# Patient Record
Sex: MALE | Race: WHITE | Employment: FULL TIME | URBAN - METROPOLITAN AREA
[De-identification: names, ages, dates, MRNs, and addresses within clinical notes are randomized per-mention and may not be internally consistent; named-entity substitution may affect disease eponyms.]

---

## 2022-03-08 ENCOUNTER — HOSPITAL ENCOUNTER (EMERGENCY)
Age: 47
Discharge: HOME | End: 2022-03-08
Attending: EMERGENCY MEDICINE
Payer: COMMERCIAL

## 2022-03-08 VITALS
HEIGHT: 72 IN | SYSTOLIC BLOOD PRESSURE: 174 MMHG | TEMPERATURE: 98.4 F | OXYGEN SATURATION: 95 % | WEIGHT: 253 LBS | BODY MASS INDEX: 34.27 KG/M2 | HEART RATE: 79 BPM | RESPIRATION RATE: 16 BRPM | DIASTOLIC BLOOD PRESSURE: 113 MMHG

## 2022-03-08 DIAGNOSIS — L03.116 CELLULITIS OF LEFT LOWER EXTREMITY: Primary | ICD-10-CM

## 2022-03-08 PROCEDURE — 99283 EMERGENCY DEPT VISIT LOW MDM: CPT

## 2022-03-08 RX ORDER — CLINDAMYCIN HYDROCHLORIDE 300 MG/1
300 CAPSULE ORAL 4 TIMES DAILY
Qty: 28 CAPSULE | Refills: 0 | Status: SHIPPED | OUTPATIENT
Start: 2022-03-08 | End: 2022-03-15

## 2022-03-09 NOTE — ED TRIAGE NOTES
Pt c/o red, warm, painful on palpation eros on L shin that began this morning. No recent long travel, no smoker.

## 2022-03-09 NOTE — DISCHARGE INSTRUCTIONS
*IBUPROFEN 600MG EVERY 8 HOURS, OR NAPROXEN 400MG EVERY 12 HOURS FOR PAIN. *WARM COMPRESSES AS OFTEN AS POSSIBLE.

## 2022-03-09 NOTE — ED PROVIDER NOTES
Patient presents for evaluation of a tender, red and slightly raised area on the distal shin of the left lower extremity. He states it started this morning and seems to be getting gradually worse. He denies any trauma. He denies any fever or chills. The history is provided by the patient. Skin Problem   This is a new problem. The current episode started 6 to 12 hours ago. The problem has been gradually worsening. The problem is associated with an unknown factor. There has been no fever. The rash is present on the left lower leg. The pain is at a severity of 4/10. The pain is mild. The pain has been constant since onset. Associated symptoms include pain. Pertinent negatives include no blisters, no itching, no weeping and no hives. He has tried nothing for the symptoms. The treatment provided no relief. Past Medical History:   Diagnosis Date    Gastrointestinal disorder     HTN (hypertension) 11/22/2013       History reviewed. No pertinent surgical history.       Family History:   Problem Relation Age of Onset   Charley Cor[place] Cancer Mother         breast    Hypertension Father     Diabetes Maternal Uncle     Cancer Maternal Grandmother         lung    Elevated Lipids Maternal Uncle        Social History     Socioeconomic History    Marital status:      Spouse name: Not on file    Number of children: Not on file    Years of education: Not on file    Highest education level: Not on file   Occupational History    Not on file   Tobacco Use    Smoking status: Never Smoker    Smokeless tobacco: Never Used   Substance and Sexual Activity    Alcohol use: No    Drug use: Not Currently    Sexual activity: Not on file   Other Topics Concern    Not on file   Social History Narrative    Not on file     Social Determinants of Health     Financial Resource Strain:     Difficulty of Paying Living Expenses: Not on file   Food Insecurity:     Worried About Running Out of Food in the Last Year: Not on file  Ran Out of Food in the Last Year: Not on file   Transportation Needs:     Lack of Transportation (Medical): Not on file    Lack of Transportation (Non-Medical): Not on file   Physical Activity:     Days of Exercise per Week: Not on file    Minutes of Exercise per Session: Not on file   Stress:     Feeling of Stress : Not on file   Social Connections:     Frequency of Communication with Friends and Family: Not on file    Frequency of Social Gatherings with Friends and Family: Not on file    Attends Congregational Services: Not on file    Active Member of [address] [place] [address] [employer] or Organizations: Not on file    Attends Club or Organization Meetings: Not on file    Marital Status: Not on file   Intimate Partner Violence:     Fear of Current or Ex-Partner: Not on file    Emotionally Abused: Not on file    Physically Abused: Not on file    Sexually Abused: Not on file   Housing Stability:     Unable to Pay for Housing in the Last Year: Not on file    Number of [place] in the Last Year: Not on file    Unstable Housing in the Last Year: Not on file         ALLERGIES: Patient has no known allergies. Review of Systems   Constitutional: Negative for chills and fever. Skin: Negative for itching and rash. All other systems reviewed and are negative. Vitals:    03/08/22 2127   BP: (!) 174/113   Pulse: 79   Resp: 16   Temp: 98.4 °F (36.9 °C)   SpO2: 95%   Weight: 114.8 kg (253 lb)   Height: 6' (1.829 m)            Physical Exam  Vitals and nursing note reviewed. Constitutional:       General: He is not in acute distress. Appearance: Normal appearance. He is normal weight. He is not ill-appearing, toxic-appearing or diaphoretic. HENT:      Head: Normocephalic and atraumatic. Eyes:      Extraocular Movements: Extraocular movements intact. Conjunctiva/sclera: Conjunctivae normal.      Pupils: Pupils are equal, round, and reactive to light. Skin:     General: Skin is warm and dry.       Capillary Refill: Capillary refill takes less than 2 seconds. Findings: Erythema present. Comments: A \"quarter sized\" area of erythema that is mildly raised mildly tender and mildly warm noted to the anterior shin of the distal left lower extremity. No calf tenderness, no edema, and PMS intact otherwise normal exam of the left lower extremity. No open wounds are noted. There are telangiectasias noted around the affected area. Neurological:      General: No focal deficit present. Mental Status: He is alert and oriented to person, place, and time. Mental status is at baseline. Psychiatric:         Mood and Affect: Mood normal.         Behavior: Behavior normal.         Thought Content: Thought content normal.          MDM  Number of Diagnoses or Management Options  Cellulitis of left lower extremity  Diagnosis management comments: Differential diagnosis is cellulitis versus superficial thrombophlebitis. We discussed the treatment for superficial thrombophlebitis as symptomatic care with warm compresses and nonsteroidal anti-inflammatory disease. And will cover for cellulitis with clindamycin.     Risk of Complications, Morbidity, and/or Mortality  Presenting problems: low  Diagnostic procedures: minimal  Management options: low    Patient Progress  Patient progress: stable         Procedures